# Patient Record
Sex: FEMALE | Race: BLACK OR AFRICAN AMERICAN | Employment: UNEMPLOYED | URBAN - METROPOLITAN AREA
[De-identification: names, ages, dates, MRNs, and addresses within clinical notes are randomized per-mention and may not be internally consistent; named-entity substitution may affect disease eponyms.]

---
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
## 2021-06-03 ENCOUNTER — OFFICE VISIT (OUTPATIENT)
Dept: INTERNAL MEDICINE CLINIC | Age: 21
End: 2021-06-03
Payer: COMMERCIAL

## 2021-06-03 VITALS
HEIGHT: 67 IN | TEMPERATURE: 98.5 F | WEIGHT: 155 LBS | OXYGEN SATURATION: 99 % | HEART RATE: 115 BPM | SYSTOLIC BLOOD PRESSURE: 132 MMHG | RESPIRATION RATE: 17 BRPM | BODY MASS INDEX: 24.33 KG/M2 | DIASTOLIC BLOOD PRESSURE: 80 MMHG

## 2021-06-03 DIAGNOSIS — R73.03 PRE-DIABETES: ICD-10-CM

## 2021-06-03 DIAGNOSIS — Z00.00 ENCOUNTER FOR MEDICAL EXAMINATION TO ESTABLISH CARE: Primary | ICD-10-CM

## 2021-06-03 DIAGNOSIS — E55.9 VITAMIN D DEFICIENCY: ICD-10-CM

## 2021-06-03 DIAGNOSIS — Z11.59 ENCOUNTER FOR HEPATITIS C SCREENING TEST FOR LOW RISK PATIENT: ICD-10-CM

## 2021-06-03 DIAGNOSIS — L30.9 ECZEMA, UNSPECIFIED TYPE: ICD-10-CM

## 2021-06-03 PROCEDURE — 90715 TDAP VACCINE 7 YRS/> IM: CPT | Performed by: INTERNAL MEDICINE

## 2021-06-03 PROCEDURE — 99203 OFFICE O/P NEW LOW 30 MIN: CPT | Performed by: INTERNAL MEDICINE

## 2021-06-03 NOTE — PROGRESS NOTES
Health Maintenance Due   Topic Date Due    Hepatitis C Screening  Never done    HPV Age 9Y-34Y (1 - 2-dose series) Never done    COVID-19 Vaccine (1) Never done    DTaP/Tdap/Td series (1 - Tdap) Never done    PAP AKA CERVICAL CYTOLOGY  Never done       Chief Complaint   Patient presents with   [facility] Patient       1. Have you been to the ER, urgent care clinic since your last visit? Hospitalized since your last visit? No    2. Have you seen or consulted any other health care providers outside of the [address] [place] [address] since your last visit? Include any pap smears or colon screening. No    3) Do you have an Advance Directive on file? no    4) Are you interested in receiving information on Advance Directives? NO      Patient is accompanied by self I have received verbal consent from Belkis Oneal to discuss any/all medical information while they are present in the room.

## 2021-06-03 NOTE — PROGRESS NOTES
HISTORY OF PRESENT ILLNESS  Chantal Martino is a 23 y.o. female. Patient with a history of eczema. Patient was seen to establish care. Patient reports a family history of kidney concerns. Has not had her kidney function checked. Is considering the COVID vaccine. Would like Dtap today. Brought in vaccine history. I need of Dtap. Has not had a PAP. Is not sexually active. Was told she had an elevated a1c a few years back. That eventually came down. Visit Vitals  /80 (BP 1 Location: Right upper arm, BP Patient Position: Sitting, BP Cuff Size: Adult)   Pulse (!) 115   Temp 98.5 °F (36.9 °C) (Oral)   Resp 17   Ht 5' 7\" (1.702 m)   Wt 155 lb (70.3 kg)   LMP 05/12/2021   SpO2 99%   BMI 24.28 kg/m²   History reviewed. No pertinent past medical history. History reviewed. No pertinent surgical history. Family History   Problem Relation Age of Onset    Diabetes Maternal Grandmother     Diabetes Maternal Grandfather     Kidney Disease Maternal Aunt     Thyroid Disease Neg Hx      Outpatient Encounter Medications as of 6/3/2021   Medication Sig Dispense Refill    magic mouthwash solution Take 15 mL by mouth every four (4) hours as needed for Pain (gargle and spit). Magic mouth wash   Maalox  Lidocaine 2% viscous   Diphenhydramine oral solution     Pharmacy to mix equal portions of ingredients to a total volume as indicated in the dispense amount. (Patient not taking: Reported on 6/3/2021) 120 mL 0     No facility-administered encounter medications on file as of 6/3/2021. HPI    Review of Systems   Constitutional: Negative. Respiratory: Negative. Cardiovascular: Negative. Gastrointestinal: Negative. Genitourinary: Negative. Musculoskeletal: Negative. Neurological: Negative. Psychiatric/Behavioral: The patient is nervous/anxious. Physical Exam  Vitals and nursing note reviewed. HENT:      Head: Normocephalic. Eyes:      Pupils: Pupils are equal, round, and reactive to light. Cardiovascular:      Rate and Rhythm: Normal rate and regular rhythm. Pulmonary:      Effort: Pulmonary effort is normal.      Breath sounds: Normal breath sounds. Abdominal:      Palpations: Abdomen is soft. Musculoskeletal:         General: Normal range of motion. Skin:     General: Skin is warm. Comments: exzema to arms    Neurological:      Mental Status: She is alert and oriented to person, place, and time. Psychiatric:         Mood and Affect: Mood is anxious. ASSESSMENT and PLAN  Diagnoses and all orders for this visit:    1. Encounter for medical examination to establish care  -     TETANUS, DIPHTHERIA TOXOIDS AND ACELLULAR PERTUSSIS VACCINE (TDAP), IN INDIVIDS. >=7, IM    2. Eczema, unspecified type  -     METABOLIC PANEL, COMPREHENSIVE; Future  -     CBC WITH AUTOMATED DIFF; Future    3. Pre-diabetes  -     HEMOGLOBIN A1C WITH EAG; Future    4. Encounter for hepatitis C screening test for low risk patient  -     HEPATITIS C AB; Future    5.  Vitamin D deficiency  -     VITAMIN D, 25 HYDROXY; Future          lab results and schedule of future lab studies reviewed with patient  reviewed diet, exercise and weight control  reviewed medications and side effects in detail

## 2021-06-04 LAB
25(OH)D3+25(OH)D2 SERPL-MCNC: 34.9 NG/ML (ref 30–100)
ALBUMIN SERPL-MCNC: 5 G/DL (ref 3.9–5)
ALBUMIN/GLOB SERPL: 1.8 {RATIO} (ref 1.2–2.2)
ALP SERPL-CCNC: 107 IU/L (ref 48–121)
ALT SERPL-CCNC: 19 IU/L (ref 0–32)
AST SERPL-CCNC: 16 IU/L (ref 0–40)
BASOPHILS # BLD AUTO: 0 X10E3/UL (ref 0–0.2)
BASOPHILS NFR BLD AUTO: 1 %
BILIRUB SERPL-MCNC: 0.8 MG/DL (ref 0–1.2)
BUN SERPL-MCNC: 15 MG/DL (ref 6–20)
BUN/CREAT SERPL: 19 (ref 9–23)
CALCIUM SERPL-MCNC: 10.4 MG/DL (ref 8.7–10.2)
CHLORIDE SERPL-SCNC: 104 MMOL/L (ref 96–106)
CO2 SERPL-SCNC: 22 MMOL/L (ref 20–29)
CREAT SERPL-MCNC: 0.79 MG/DL (ref 0.57–1)
EOSINOPHIL # BLD AUTO: 0.1 X10E3/UL (ref 0–0.4)
EOSINOPHIL NFR BLD AUTO: 2 %
ERYTHROCYTE [DISTWIDTH] IN BLOOD BY AUTOMATED COUNT: 12.8 % (ref 11.7–15.4)
EST. AVERAGE GLUCOSE BLD GHB EST-MCNC: 100 MG/DL
GLOBULIN SER CALC-MCNC: 2.8 G/DL (ref 1.5–4.5)
GLUCOSE SERPL-MCNC: 85 MG/DL (ref 65–99)
HBA1C MFR BLD: 5.1 % (ref 4.8–5.6)
HCT VFR BLD AUTO: 45.1 % (ref 34–46.6)
HCV AB S/CO SERPL IA: <0.1 S/CO RATIO (ref 0–0.9)
HGB BLD-MCNC: 14 G/DL (ref 11.1–15.9)
IMM GRANULOCYTES # BLD AUTO: 0 X10E3/UL (ref 0–0.1)
IMM GRANULOCYTES NFR BLD AUTO: 0 %
LYMPHOCYTES # BLD AUTO: 1.1 X10E3/UL (ref 0.7–3.1)
LYMPHOCYTES NFR BLD AUTO: 23 %
MCH RBC QN AUTO: 26.5 PG (ref 26.6–33)
MCHC RBC AUTO-ENTMCNC: 31 G/DL (ref 31.5–35.7)
MCV RBC AUTO: 85 FL (ref 79–97)
MONOCYTES # BLD AUTO: 0.3 X10E3/UL (ref 0.1–0.9)
MONOCYTES NFR BLD AUTO: 5 %
NEUTROPHILS # BLD AUTO: 3.2 X10E3/UL (ref 1.4–7)
NEUTROPHILS NFR BLD AUTO: 69 %
PLATELET # BLD AUTO: 263 X10E3/UL (ref 150–450)
POTASSIUM SERPL-SCNC: 4.5 MMOL/L (ref 3.5–5.2)
PROT SERPL-MCNC: 7.8 G/DL (ref 6–8.5)
RBC # BLD AUTO: 5.29 X10E6/UL (ref 3.77–5.28)
SODIUM SERPL-SCNC: 141 MMOL/L (ref 134–144)
WBC # BLD AUTO: 4.6 X10E3/UL (ref 3.4–10.8)

## 2021-06-10 ENCOUNTER — TELEPHONE (OUTPATIENT)
Dept: INTERNAL MEDICINE CLINIC | Age: 21
End: 2021-06-10

## 2021-10-19 ENCOUNTER — OFFICE VISIT (OUTPATIENT)
Dept: INTERNAL MEDICINE CLINIC | Age: 21
End: 2021-10-19
Payer: COMMERCIAL

## 2021-10-19 VITALS
DIASTOLIC BLOOD PRESSURE: 78 MMHG | WEIGHT: 164 LBS | HEART RATE: 114 BPM | OXYGEN SATURATION: 98 % | RESPIRATION RATE: 16 BRPM | SYSTOLIC BLOOD PRESSURE: 136 MMHG | BODY MASS INDEX: 25.74 KG/M2 | HEIGHT: 67 IN | TEMPERATURE: 98.5 F

## 2021-10-19 DIAGNOSIS — Z23 NEEDS FLU SHOT: ICD-10-CM

## 2021-10-19 DIAGNOSIS — L73.2 HIDRADENITIS SUPPURATIVA OF RIGHT AXILLA: Primary | ICD-10-CM

## 2021-10-19 PROCEDURE — 99213 OFFICE O/P EST LOW 20 MIN: CPT | Performed by: INTERNAL MEDICINE

## 2021-10-19 PROCEDURE — 90686 IIV4 VACC NO PRSV 0.5 ML IM: CPT | Performed by: INTERNAL MEDICINE

## 2021-10-19 NOTE — PROGRESS NOTES
ADVISED PATIENT OF THE FOLLOWING HEALTH MAINTAINCE DUE  Health Maintenance Due   Topic Date Due    HPV Age 9Y-34Y (1 - 2-dose series) Never done    Pap Smear  Never done    Flu Vaccine (1) Never done      Chief Complaint   Patient presents with    Skin Problem    Dry Skin       1. Have you been to the ER, urgent care clinic since your last visit? Hospitalized since your last visit? No    2. Have you seen or consulted any other health care providers outside of the [address] [place] [address] since your last visit? Include any DEXA scan, mammography  or colon screening. No    3. Do you have an Advance Directive on file? no    4. Do you have a DNR on file? NO    Patient is accompanied by self I have received verbal consent from Debbie Gerardo to discuss any/all medical information while they are present in the room. No flowsheet data found. A[facility]a Mcinty[facility] [address]  Phone: [phone] Fax: [phone]      Debbie Gerardo is a 24 y.o. female  who presents for routine immunization(s). Patient denies any symptoms , reactions or allergies that would exclude them from being immunized today. Risks and adverse reactions were discussed. The patient/caregiver was provided the VIS and allotted time to read and ask questions prior to administration of vaccine. Patient voiced full understanding and signed Adult Immunization Consent form. All questions were addressed. Patient was observed for 10 min post injection. There were no reactions observed.

## 2021-10-19 NOTE — PROGRESS NOTES
HISTORY OF PRESENT ILLNESS  Irene Weiss is a 24 y.o. female. Patient was seen after she reports that she has been having reoccurring boils to the right armpit. Reports that it began in July. Will be very painful and drain over time. Had one a few weeks ago, but that has now healed. Only happens to the armpit, no groin. Does sweat a lot to the area and shaves. No fever, does not move to other locations. Visit Vitals  /78 (BP 1 Location: Right arm, BP Patient Position: Sitting, BP Cuff Size: Adult)   Pulse (!) 114   Temp 98.5 °F (36.9 °C) (Oral)   Resp 16   Ht 5' 7\" (1.702 m)   Wt 164 lb (74.4 kg)   LMP 10/05/2021   SpO2 98%   BMI 25.69 kg/m²   No past medical history on file. No past surgical history on file. Family History   Problem Relation Age of Onset    Diabetes Maternal Grandmother     Diabetes Maternal Grandfather     Kidney Disease Maternal Aunt     Thyroid Disease Neg Hx      HPI    Review of Systems   Constitutional: Negative. Respiratory: Negative. Cardiovascular: Negative. Neurological: Negative. Physical Exam  Vitals and nursing note reviewed. Cardiovascular:      Rate and Rhythm: Normal rate and regular rhythm. Pulmonary:      Effort: Pulmonary effort is normal.      Breath sounds: Normal breath sounds. Musculoskeletal:         General: Normal range of motion. Skin:     General: Skin is warm. Comments: Healing area to the right axilla, no drainage, non tender   Neurological:      Mental Status: She is alert and oriented to person, place, and time. ASSESSMENT and PLAN  Diagnoses and all orders for this visit:    1. Hidradenitis suppurativa of right axilla  - consider making a dermatologist appointment   -went over body hygiene to groin and axilla area  - reviewed that pain management and antibiotics use may be need in the future. 2. Needs flu shot      Follow-up and Dispositions    · Return if symptoms worsen or fail to improve.        reviewed diet, exercise and weight control  reviewed medications and side effects in detail

## 2021-10-19 NOTE — PATIENT INSTRUCTIONS
Vaccine Information Statement    Influenza (Flu) Vaccine (Inactivated or Recombinant): What You Need to Know    Many vaccine information statements are available in Chinese and other languages. See www.immunize.org/vis. Hojas de información sobre vacunas están disponibles en español y en muchos otros idiomas. Visite www.immunize.org/vis. 1. Why get vaccinated? Influenza vaccine can prevent influenza (flu). Flu is a contagious disease that spreads around the [facility] every year, usually between October and May. Anyone can get the flu, but it is more dangerous for some people. Infants and young children, people 72 years and older, pregnant people, and people with certain health conditions or a weakened immune system are at greatest risk of flu complications. Pneumonia, bronchitis, sinus infections, and ear infections are examples of flu-related complications. If you have a medical condition, such as heart disease, cancer, or diabetes, flu can make it worse. Flu can cause fever and chills, sore throat, muscle aches, fatigue, cough, headache, and runny or stuffy nose. Some people may have vomiting and diarrhea, though this is more common in children than adults. In an average year, thousands of people in the [facility] die from flu, and many more are hospitalized. Flu vaccine prevents millions of illnesses and flu-related visits to the doctor each year. 2. Influenza vaccines     CDC recommends everyone 6 months and older get vaccinated every flu season. Children 6 months through 6years of age may need 2 doses during a single flu season. Everyone else needs only 1 dose each flu season. It takes about 2 weeks for protection to develop after vaccination. There are many flu viruses, and they are always changing. Each year a new flu vaccine is made to protect against the influenza viruses believed to be likely to cause disease in the upcoming flu season.  Even when the vaccine doesnt exactly match these viruses, it may still provide some protection. Influenza vaccine does not cause flu. Influenza vaccine may be given at the same time as other vaccines. 3. Talk with your health care provider    Tell your vaccination provider if the person getting the vaccine:   Has had an allergic reaction after a previous dose of influenza vaccine, or has any severe, life-threatening allergies    Has ever had Guillain-Barré Syndrome (also called GBS)    In some cases, your health care provider may decide to postpone influenza vaccination until a future visit. Influenza vaccine can be administered at any time during pregnancy. People who are or will be pregnant during influenza season should receive inactivated influenza vaccine. People with minor illnesses, such as a cold, may be vaccinated. People who are moderately or severely ill should usually wait until they recover before getting influenza vaccine. Your health care provider can give you more information. 4. Risks of a vaccine reaction     Soreness, redness, and swelling where the shot is given, fever, muscle aches, and headache can happen after influenza vaccination.  There may be a very small increased risk of Guillain-Barré Syndrome (GBS) after inactivated influenza vaccine (the flu shot). Hildy Paddy children who get the flu shot along with pneumococcal vaccine (PCV13) and/or DTaP vaccine at the same time might be slightly more likely to have a seizure caused by fever. Tell your health care provider if a child who is getting flu vaccine has ever had a seizure. People sometimes faint after medical procedures, including vaccination. Tell your provider if you feel dizzy or have vision changes or ringing in the ears. As with any medicine, there is a very remote chance of a vaccine causing a severe allergic reaction, other serious injury, or death. 5. What if there is a serious problem?     An allergic reaction could occur after the vaccinated person leaves the clinic. If you see signs of a severe allergic reaction (hives, swelling of the face and throat, difficulty breathing, a fast heartbeat, dizziness, or weakness), call 9-1-1 and get the person to the nearest hospital.    For other signs that concern you, call your health care provider. Adverse reactions should be reported to the Vaccine Adverse Event Reporting System (VAERS). Your health care provider will usually file this report, or you can do it yourself. Visit the VAERS website at www.vaers. [facility].gov or call 3-844.524.7283. VAERS is only for reporting reactions, and VAERS staff members do not give medical advice. 6. The National Vaccine Injury Compensation Program    The [facility] Vaccine Injury Compensation Program (VICP) is a federal program that was created to compensate people who may have been injured by certain vaccines. Claims regarding alleged injury or death due to vaccination have a time limit for filing, which may be as short as two years. Visit the VICP website at www.[facility].gov/vaccinecompensation or call 1-356.760.6346 to learn about the program and about filing a claim. 7. How can I learn more?  Ask your health care provider.  Call your local or state health department.  Visit the website of the Food and Drug Administration (FDA) for vaccine package inserts and additional information at www.fda.gov/vaccines-blood-biologics/vaccines.  Contact the Centers for Disease Control and Prevention (CDC):  - Call 3-229.451.2847 (1-800-CDC-INFO) or  - Visit CDCs influenza website at www.cdc.gov/flu. Vaccine Information Statement   Inactivated Influenza Vaccine   8/6/2021  42 DAMION Rivera [id number]   Department of Health and Human Services  Centers for Disease Control and Prevention    Office Use Only

## 2021-10-28 DIAGNOSIS — L73.2 HIDRADENITIS SUPPURATIVA OF RIGHT AXILLA: Primary | ICD-10-CM

## 2021-10-28 RX ORDER — DOXYCYCLINE 100 MG/1
100 TABLET ORAL 2 TIMES DAILY
Qty: 14 TABLET | Refills: 0 | Status: SHIPPED | OUTPATIENT
Start: 2021-10-28 | End: 2022-01-03 | Stop reason: SDUPTHER

## 2022-01-02 ENCOUNTER — PATIENT MESSAGE (OUTPATIENT)
Dept: INTERNAL MEDICINE CLINIC | Age: 22
End: 2022-01-02

## 2022-01-02 DIAGNOSIS — L73.2 HIDRADENITIS SUPPURATIVA OF RIGHT AXILLA: ICD-10-CM

## 2022-01-03 RX ORDER — DOXYCYCLINE 100 MG/1
100 TABLET ORAL 2 TIMES DAILY
Qty: 14 TABLET | Refills: 0 | Status: SHIPPED | OUTPATIENT
Start: 2022-01-03 | End: 2022-05-27 | Stop reason: ALTCHOICE

## 2022-03-18 PROBLEM — L30.9 ECZEMA: Status: ACTIVE | Noted: 2021-06-03

## 2022-05-13 ENCOUNTER — NURSE TRIAGE (OUTPATIENT)
Dept: OTHER | Facility: CLINIC | Age: 22
End: 2022-05-13

## 2022-05-13 NOTE — TELEPHONE ENCOUNTER
Received call from Judson Fernández at [facility] with [employer] Complaint. Subjective: Caller states \"Stye on my eye\"     Current Symptoms: No facial swelling. No redness around her eye. Sty on L eyelid, upper eyelid. Upper eyelid is swollen. No pain    Onset: 1 week ago; worsening    Associated Symptoms: NA    Pain Severity: 0/10; ;     Temperature: denies fever     What has been tried: warm compress    LMP: NA Pregnant: NA    Recommended disposition: See PCP within 3 Days    Care advice provided, patient verbalizes understanding; denies any other questions or concerns; instructed to call back for any new or worsening symptoms. Patient/Caller agrees with recommended disposition; writer provided warm transfer to Alex Osman at [facility] for appointment scheduling    Attention Provider: Thank you for allowing me to participate in the care of your patient. The patient was connected to triage in response to information provided to the [facility]. Please do not respond through this encounter as the response is not directed to a shared pool.       Reason for Disposition   [1] After 5 days of treatment per [facility] Advice AND [2] not better    Protocols used: STY-ADULT-

## 2022-05-27 ENCOUNTER — OFFICE VISIT (OUTPATIENT)
Dept: INTERNAL MEDICINE CLINIC | Age: 22
End: 2022-05-27
Payer: COMMERCIAL

## 2022-05-27 VITALS
OXYGEN SATURATION: 99 % | TEMPERATURE: 98.6 F | WEIGHT: 171 LBS | HEART RATE: 109 BPM | SYSTOLIC BLOOD PRESSURE: 138 MMHG | RESPIRATION RATE: 16 BRPM | HEIGHT: 67 IN | BODY MASS INDEX: 26.84 KG/M2 | DIASTOLIC BLOOD PRESSURE: 80 MMHG

## 2022-05-27 DIAGNOSIS — N63.23 MASS OF LOWER OUTER QUADRANT OF LEFT BREAST: Primary | ICD-10-CM

## 2022-05-27 PROCEDURE — 99213 OFFICE O/P EST LOW 20 MIN: CPT | Performed by: INTERNAL MEDICINE

## 2022-05-27 NOTE — PROGRESS NOTES
Health Maintenance Due   Topic Date Due    HPV Age 9Y-34Y (1 - 2-dose series) Never done    Pap Smear  Never done    COVID-19 Vaccine (3 - Booster for Spence Sanya series) 12/26/2021       Chief Complaint   Patient presents with    Breast Mass       1. Have you been to the ER, urgent care clinic since your last visit? Hospitalized since your last visit? No    2. Have you seen or consulted any other health care providers outside of the [address] [place] [address] since your last visit? Include any pap smears or colon screening. No    3) Do you have an Advance Directive on file? no    4) Are you interested in receiving information on Advance Directives? NO      Patient is accompanied by self I have received verbal consent from Joseph Duggan to discuss any/all medical information while they are present in the room.

## 2022-05-27 NOTE — PROGRESS NOTES
HISTORY OF PRESENT ILLNESS  Jess Cortes is a 24 y.o. female. Patient was seen after she noticed a lump to the left lower breast on 5/11. Reports no pain or changes to breast at all. Had her period that ended on 5/21. Reports that she is not on a BC or has had no PAP. Noticed the area after laying down. No discharge,redness, nipple inversion. Family history of fibrotic breast that at benign . No breast breast Ca in the family. Visit Vitals  /80 (BP 1 Location: Left upper arm, BP Patient Position: Sitting, BP Cuff Size: Adult) Comment: patient very nervous   Pulse (!) 109   Temp 98.6 °F (37 °C) (Oral)   Resp 16   Ht 5' 7\" (1.702 m)   Wt 171 lb (77.6 kg)   SpO2 99%   BMI 26.78 kg/m²     History reviewed. No pertinent past medical history. No past surgical history on file. Family History   Problem Relation Age of Onset    Diabetes Maternal Grandmother     Diabetes Maternal Grandfather     Kidney Disease Maternal Aunt     Thyroid Disease Neg Hx      Outpatient Encounter Medications as of 5/27/2022   Medication Sig Dispense Refill    [DISCONTINUED] doxycycline (ADOXA) 100 mg tablet Take 1 Tablet by mouth two (2) times a day. 14 Tablet 0     No facility-administered encounter medications on file as of 5/27/2022. HPI    Review of Systems   Constitutional: Negative. Respiratory: Negative. Cardiovascular: Negative. Gastrointestinal: Negative. Musculoskeletal: Negative. Skin:        Lump to breast    Neurological: Negative. Physical Exam  Vitals and nursing note reviewed. Exam conducted with a chaperone present. Cardiovascular:      Rate and Rhythm: Normal rate and regular rhythm. Pulmonary:      Effort: Pulmonary effort is normal.      Breath sounds: Normal breath sounds. Chest:      Chest wall: No swelling or tenderness. Breasts: Breasts are symmetrical.      Left: No inverted nipple, skin change or tenderness.             Comments: Tiny area to left breast   Abdominal: Palpations: Abdomen is soft. Skin:     General: Skin is warm. Neurological:      Mental Status: She is alert and oriented to person, place, and time. Psychiatric:         Behavior: Behavior normal.         ASSESSMENT and PLAN  Diagnoses and all orders for this visit:    1.  Mass of lower outer quadrant of left breast  -     US BREAST AXILLA LT; Future      Follow-up and Dispositions    · Return if symptoms worsen or fail to improve.       lab results and schedule of future lab studies reviewed with patient  reviewed medications and side effects in detail

## 2022-07-21 ENCOUNTER — DOCUMENTATION ONLY (OUTPATIENT)
Dept: INTERNAL MEDICINE CLINIC | Age: 22
End: 2022-07-21

## 2022-07-21 ENCOUNTER — NURSE TRIAGE (OUTPATIENT)
Dept: OTHER | Facility: CLINIC | Age: 22
End: 2022-07-21

## 2022-07-21 NOTE — TELEPHONE ENCOUNTER
Received call from Malika Rodas at [facility] with [employer] Complaint. Subjective: Caller states \"high blood pressure\" 145/98    Current Symptoms: nose bleed on July 1st, none since then    Onset: 3 weeks ago; sudden    Associated Symptoms: NA    Pain Severity: 0/10; Temperature: denies fever     What has been tried: nothing    LMP:  July 4th  Pregnant: No    Recommended disposition: See in Office Within 2 Weeks    Care advice provided, patient verbalizes understanding; denies any other questions or concerns; instructed to call back for any new or worsening symptoms. Patient/Caller agrees with recommended disposition; writer provided warm transfer to [employer] at [facility] for appointment scheduling    Attention Provider: Thank you for allowing me to participate in the care of your patient. The patient was connected to triage in response to information provided to the [facility]. Please do not respond through this encounter as the response is not directed to a shared pool.     Reason for Disposition   Systolic BP >= 910 OR Diastolic >= 80, and is not taking BP medications    Protocols used: Blood Pressure - High-ADULT-[place]

## 2022-07-21 NOTE — PROGRESS NOTES
Pt sent over from nurse triage for an appointment. Pt states her bp is high, no symptoms. Pt requesting an appointment for next week. Pt not able to come in for appointment on 7/25. Pt scheduled for vv on 7/26/22 with Glenn aSnders. Pt advised to go to urgent care if symptoms develop or if she has concerns.  Pt voiced understanding

## 2022-07-26 ENCOUNTER — PATIENT MESSAGE (OUTPATIENT)
Dept: INTERNAL MEDICINE CLINIC | Age: 22
End: 2022-07-26

## 2022-07-26 ENCOUNTER — VIRTUAL VISIT (OUTPATIENT)
Dept: INTERNAL MEDICINE CLINIC | Age: 22
End: 2022-07-26

## 2022-07-26 DIAGNOSIS — I10 HYPERTENSION, UNSPECIFIED TYPE: Primary | ICD-10-CM

## 2022-07-26 PROCEDURE — 99213 OFFICE O/P EST LOW 20 MIN: CPT | Performed by: NURSE PRACTITIONER

## 2022-07-26 RX ORDER — CHOLECALCIFEROL (VITAMIN D3) 50 MCG
CAPSULE ORAL
COMMUNITY

## 2022-07-26 RX ORDER — VITAMIN E CAP 100 UNIT 100 UNIT
CAP ORAL DAILY
COMMUNITY

## 2022-07-26 RX ORDER — UREA 10 %
100 LOTION (ML) TOPICAL DAILY
COMMUNITY

## 2022-07-26 RX ORDER — LOSARTAN POTASSIUM 25 MG/1
25 TABLET ORAL DAILY
Qty: 30 TABLET | Refills: 1 | Status: SHIPPED | OUTPATIENT
Start: 2022-07-26 | End: 2022-08-19 | Stop reason: DRUGHIGH

## 2022-07-26 RX ORDER — ASCORBIC ACID 500 MG
TABLET ORAL
COMMUNITY

## 2022-07-26 NOTE — PROGRESS NOTES
Marivel Michelle is a 24 y.o. female who was seen by synchronous (real-time) audio-video technology on 7/26/2022 for Hypertension      Assessment & Plan:   Diagnoses and all orders for this visit:    1. Hypertension, unspecified type  Will order  -     losartan (COZAAR) 25 mg tablet; Take 1 Tablet by mouth in the morning. Advised to monitor BP daily and follow-up in office in 2 weeks, or sooner as needed. Patient encouraged to call or return to office if symptoms do not improve or worsen. If experiencing severe shortness of breath or chest pain, present to the ER. If experiencing severe head pain and/or neurological changes such as slurred speech, vision change, or extremity weakness, present to ER. Reviewed medications and side effects in detail. Reviewed plan of care with patient who acknowledges understanding and agrees. Subjective: This patient of Chava Varghese NP presents today with concerns about her blood pressure. The patient states she experienced a nosebleed on 07/01/22. Nosebleed quickly resolved with pressure to nose. She did have small amount of blood in nostrils for next two days, as well. No other s/sx of bleeding/ bruising. No further nosebleeds. The patient has monitored her BP since time of incident and found it to be consistently elevated, 140s-160s/80s-90s on home BP monitoring. She denies chest pain, shortness of breath, and dizziness. She has had intermittent mild frontal headaches. Patient mentions a strong family history of kidney disease. Patient states she typically follows a healthy diet and exercises regularly.   Lab Results   Component Value Date/Time    Sodium 141 06/03/2021 11:17 AM    Potassium 4.5 06/03/2021 11:17 AM    Chloride 104 06/03/2021 11:17 AM    CO2 22 06/03/2021 11:17 AM    Glucose 85 06/03/2021 11:17 AM    BUN 15 06/03/2021 11:17 AM    Creatinine 0.79 06/03/2021 11:17 AM    BUN/Creatinine ratio 19 06/03/2021 11:17 AM    GFR est  06/03/2021 11:17 AM    GFR est non- 06/03/2021 11:17 AM    Calcium 10.4 (H) 06/03/2021 11:17 AM       Prior to Admission medications    Medication Sig Start Date End Date Taking? Authorizing Provider   vitamin e (E GEMS) 100 unit capsule Take  by mouth daily. Yes Provider, Historical   zinc sulfate (ZINC-220 PO) Take  by mouth. Yes Provider, Historical   omega 3-dha-epa-fish oil (Fish OiL) 100-160-1,000 mg cap Take  by mouth. Yes Provider, Historical   prenatal multivit-ca-min-fe-fa tab Take  by mouth. Yes Provider, Historical   ascorbic acid, vitamin C, (Vitamin C) 500 mg tablet Take  by mouth. Yes Provider, Historical   cholecalciferol, vitamin D3, (D3-2000 PO) Take  by mouth. Yes Provider, Historical   cyanocobalamin (Vitamin B-12) 100 mcg tablet Take 100 mcg by mouth in the morning. Yes Provider, Historical   losartan (COZAAR) 25 mg tablet Take 1 Tablet by mouth in the morning. 7/26/22  Yes Noel Boo NP     No Known Allergies  No past medical history on file. No past surgical history on file. Review of Systems   Constitutional:  Negative for chills, fever and malaise/fatigue. HENT: Negative. Respiratory: Negative. Cardiovascular: Negative. Gastrointestinal: Negative. Genitourinary: Negative. Musculoskeletal: Negative. Skin: Negative. Neurological:  Positive for headaches. Negative for dizziness, sensory change, speech change and focal weakness. Psychiatric/Behavioral:  The patient is nervous/anxious.       Objective:     Patient-Reported Vitals 7/26/2022   Patient-Reported Weight 164.6   Patient-Reported Pulse 107   Patient-Reported Temperature 97.1   Patient-Reported Systolic  290   Patient-Reported Diastolic 87        [INSTRUCTIONS:  \"[x]\" Indicates a positive item  \"[]\" Indicates a negative item  -- DELETE ALL ITEMS NOT EXAMINED]    Constitutional: [x] Appears well-developed and well-nourished [x] No apparent distress      [] Abnormal -     Mental status: [x] Alert and awake  [x] Oriented to person/place/time [x] Able to follow commands    [] Abnormal -     Eyes:   EOM    [x]  Normal    [] Abnormal -   Sclera  [x]  Normal    [] Abnormal -          Discharge [x]  None visible   [] Abnormal -     HENT: [x] Normocephalic, atraumatic  [] Abnormal -       Neck: [x] No visualized mass [] Abnormal -     Pulmonary/Chest: [x] Respiratory effort normal   [x] No visualized signs of difficulty breathing or respiratory distress        [] Abnormal -      Musculoskeletal:           [x] Normal range of motion of neck        [] Abnormal -     Neurological:        [x] No Facial Asymmetry (Cranial nerve 7 motor function) (limited exam due to video visit)          [x] No gaze palsy        [] Abnormal -          Skin:        [x] No significant exanthematous lesions or discoloration noted on facial skin         [] Abnormal -            Psychiatric:       [x] Normal Affect [] Abnormal -        [x] No Hallucinations    Other pertinent observable physical exam findings:-        We discussed the expected course, resolution and complications of the diagnosis(es) in detail. Medication risks, benefits, costs, interactions, and alternatives were discussed as indicated. I advised her to contact the office if her condition worsens, changes or fails to improve as anticipated. She expressed understanding with the diagnosis(es) and plan. Gloria Peter, was evaluated through a synchronous (real-time) audio-video encounter. The patient (or guardian if applicable) is aware that this is a billable service, which includes applicable co-pays. This Virtual Visit was conducted with patient's (and/or legal guardian's) consent. The visit was conducted pursuant to the emergency declaration under the [address] [place] [address] authority and the [employer] and [employer] General Act.   Patient identification was verified, and a caregiver was present when appropriate.   The patient was located at: Home: [address] [facility] [address]  The provider was located at: Home: [unfilled]        Debbie Wang NP

## 2022-07-27 DIAGNOSIS — I10 HYPERTENSION, UNSPECIFIED TYPE: Primary | ICD-10-CM

## 2022-07-30 LAB
ALBUMIN SERPL-MCNC: 4.9 G/DL (ref 3.9–5)
ALBUMIN/GLOB SERPL: 1.8 {RATIO} (ref 1.2–2.2)
ALP SERPL-CCNC: 91 IU/L (ref 44–121)
ALT SERPL-CCNC: 28 IU/L (ref 0–32)
AST SERPL-CCNC: 17 IU/L (ref 0–40)
BILIRUB SERPL-MCNC: 0.8 MG/DL (ref 0–1.2)
BUN SERPL-MCNC: 10 MG/DL (ref 6–20)
BUN/CREAT SERPL: 13 (ref 9–23)
CALCIUM SERPL-MCNC: 10.2 MG/DL (ref 8.7–10.2)
CHLORIDE SERPL-SCNC: 106 MMOL/L (ref 96–106)
CO2 SERPL-SCNC: 22 MMOL/L (ref 20–29)
CREAT SERPL-MCNC: 0.78 MG/DL (ref 0.57–1)
EGFR: 110 ML/MIN/1.73
GLOBULIN SER CALC-MCNC: 2.7 G/DL (ref 1.5–4.5)
GLUCOSE SERPL-MCNC: 85 MG/DL (ref 65–99)
POTASSIUM SERPL-SCNC: 5.2 MMOL/L (ref 3.5–5.2)
PROT SERPL-MCNC: 7.6 G/DL (ref 6–8.5)
SODIUM SERPL-SCNC: 142 MMOL/L (ref 134–144)

## 2022-08-02 ENCOUNTER — PATIENT MESSAGE (OUTPATIENT)
Dept: INTERNAL MEDICINE CLINIC | Age: 22
End: 2022-08-02

## 2022-08-11 ENCOUNTER — VIRTUAL VISIT (OUTPATIENT)
Dept: INTERNAL MEDICINE CLINIC | Age: 22
End: 2022-08-11
Payer: COMMERCIAL

## 2022-08-11 DIAGNOSIS — I10 HYPERTENSION, UNSPECIFIED TYPE: Primary | ICD-10-CM

## 2022-08-11 PROCEDURE — 99213 OFFICE O/P EST LOW 20 MIN: CPT | Performed by: INTERNAL MEDICINE

## 2022-08-11 NOTE — PROGRESS NOTES
Leo Juan is a 24 y.o. female who was seen by synchronous (real-time) audio-video technology on 8/11/2022 for Hypertension and Dry Skin        Assessment & Plan:   Diagnoses and all orders for this visit:    1. Hypertension, unspecified type  - losartan 25 mg daily  -DASH diet  -BP goal below 140  - labs stable     Follow-up and Dispositions    Return in about 6 months (around 2/11/2023), or if symptoms worsen or fail to improve. Subjective:     Patient was seen via video. Was started on BP medication in the last few weeks. Reports that she had a nosebleed and after taking her BP it was elevated for several days. She is now getting BP in the 120's with an occasional 130-140. Reports that she is trying to at lean and workout. Family history of CKD and HTN    Prior to Admission medications    Medication Sig Start Date End Date Taking? Authorizing Provider   vitamin e (E GEMS) 100 unit capsule Take  by mouth daily. Yes Provider, Historical   zinc sulfate (ZINC-220 PO) Take  by mouth. Yes Provider, Historical   omega 3-dha-epa-fish oil (Fish OiL) 100-160-1,000 mg cap Take  by mouth. Yes Provider, Historical   prenatal multivit-ca-min-fe-fa tab Take  by mouth. Yes Provider, Historical   ascorbic acid, vitamin C, (VITAMIN C) 500 mg tablet Take  by mouth. Yes Provider, Historical   cholecalciferol, vitamin D3, (D3-2000 PO) Take  by mouth. Yes Provider, Historical   cyanocobalamin (VITAMIN B12) 100 mcg tablet Take 100 mcg by mouth in the morning. Yes Provider, Historical   losartan (COZAAR) 25 mg tablet Take 1 Tablet by mouth in the morning.  7/26/22  Yes Ryan Ann, NP     Patient Active Problem List   Diagnosis Code    Excessive weight gain R63.5    Acanthosis nigricans L83    Abnormal laboratory test R89.9    Eczema L30.9     Patient Active Problem List    Diagnosis Date Noted    Eczema 06/03/2021    Excessive weight gain 03/11/2014    Acanthosis nigricans 03/11/2014    Abnormal laboratory test 03/11/2014     Current Outpatient Medications   Medication Sig Dispense Refill    vitamin e (E GEMS) 100 unit capsule Take  by mouth daily. zinc sulfate (ZINC-220 PO) Take  by mouth. omega 3-dha-epa-fish oil (Fish OiL) 100-160-1,000 mg cap Take  by mouth.      prenatal multivit-ca-min-fe-fa tab Take  by mouth. ascorbic acid, vitamin C, (VITAMIN C) 500 mg tablet Take  by mouth. cholecalciferol, vitamin D3, (D3-2000 PO) Take  by mouth.      cyanocobalamin (VITAMIN B12) 100 mcg tablet Take 100 mcg by mouth in the morning. losartan (COZAAR) 25 mg tablet Take 1 Tablet by mouth in the morning. 30 Tablet 1     No Known Allergies  History reviewed. No pertinent past medical history. History reviewed. No pertinent surgical history. Family History   Problem Relation Age of Onset    Hypertension Mother     Hypertension Father     Kidney Disease Maternal Aunt     Diabetes Maternal Grandmother     Diabetes Maternal Grandfather     Thyroid Disease Neg Hx      Social History     Tobacco Use    Smoking status: Never    Smokeless tobacco: Never   Substance Use Topics    Alcohol use: No       Review of Systems   All other systems reviewed and are negative.     Objective:     Patient-Reported Vitals 8/11/2022   Patient-Reported Weight -   Patient-Reported Pulse 102   Patient-Reported Temperature -   Patient-Reported Systolic  295   Patient-Reported Diastolic 86        [INSTRUCTIONS:  \"[x]\" Indicates a positive item  \"[]\" Indicates a negative item  -- DELETE ALL ITEMS NOT EXAMINED]    Constitutional: [x] Appears well-developed and well-nourished [x] No apparent distress      [] Abnormal -     Mental status: [x] Alert and awake  [x] Oriented to person/place/time [x] Able to follow commands    [] Abnormal -     Eyes:   EOM    [x]  Normal    [] Abnormal -   Sclera  [x]  Normal    [] Abnormal -          Discharge [x]  None visible   [] Abnormal -     HENT: [x] Normocephalic, atraumatic  [] Abnormal -   [x] Mouth/Throat: Mucous membranes are moist    External Ears [x] Normal  [] Abnormal -    Neck: [x] No visualized mass [] Abnormal -     Pulmonary/Chest: [x] Respiratory effort normal   [x] No visualized signs of difficulty breathing or respiratory distress        [] Abnormal -      Musculoskeletal:   [x] Normal gait with no signs of ataxia         [x] Normal range of motion of neck        [] Abnormal -     Neurological:        [x] No Facial Asymmetry (Cranial nerve 7 motor function) (limited exam due to video visit)          [x] No gaze palsy        [] Abnormal -          Skin:        [x] No significant exanthematous lesions or discoloration noted on facial skin         [] Abnormal -            Psychiatric:       [x] Normal Affect [] Abnormal -        [x] No Hallucinations    Other pertinent observable physical exam findings:-        We discussed the expected course, resolution and complications of the diagnosis(es) in detail. Medication risks, benefits, costs, interactions, and alternatives were discussed as indicated. I advised her to contact the office if her condition worsens, changes or fails to improve as anticipated. She expressed understanding with the diagnosis(es) and plan. Aleida Garcia, was evaluated through a synchronous (real-time) audio-video encounter. The patient (or guardian if applicable) is aware that this is a billable service, which includes applicable co-pays. This Virtual Visit was conducted with patient's (and/or legal guardian's) consent. The visit was conducted pursuant to the emergency declaration under the [address] [place] [address], [address] [place] [address] authority and the [employer] and [employer] General Act. Patient identification was verified, and a caregiver was present when appropriate. The patient was located at: Home: [address] [place] [id number]  The provider was located at:  Other: ana m Rodriguez NP

## 2022-08-11 NOTE — PROGRESS NOTES
Health Maintenance Due   Topic Date Due    HPV Age 9Y-34Y (1 - 2-dose series) Never done    Pap Smear  Never done    COVID-19 Vaccine (3 - Booster for Spence Sanya series) 12/26/2021       Chief Complaint   Patient presents with    Hypertension    Dry Skin       1. Have you been to the ER, urgent care clinic since your last visit? Hospitalized since your last visit? No    2. Have you seen or consulted any other health care providers outside of the [address] [place] [address] since your last visit? Include any pap smears or colon screening. No    3) Do you have an Advance Directive on file? no    4) Are you interested in receiving information on Advance Directives? NO      Patient is accompanied by self I have received verbal consent from Julio Mccurdy to discuss any/all medical information while they are present in the room.

## 2022-08-19 DIAGNOSIS — I10 HYPERTENSION, UNSPECIFIED TYPE: Primary | ICD-10-CM

## 2022-08-19 RX ORDER — LOSARTAN POTASSIUM 50 MG/1
50 TABLET ORAL DAILY
Qty: 60 TABLET | Refills: 1 | Status: SHIPPED | OUTPATIENT
Start: 2022-08-19 | End: 2022-09-12

## 2022-08-26 RX ORDER — DOXYCYCLINE 100 MG/1
100 TABLET ORAL 2 TIMES DAILY
Qty: 14 TABLET | Refills: 0 | Status: SHIPPED | OUTPATIENT
Start: 2022-08-26 | End: 2022-09-02

## 2022-09-12 DIAGNOSIS — I10 HYPERTENSION, UNSPECIFIED TYPE: Primary | ICD-10-CM

## 2022-09-12 RX ORDER — BENAZEPRIL/HYDROCHLOROTHIAZIDE 20 MG-25MG
1 TABLET ORAL DAILY
Qty: 30 TABLET | Refills: 1 | Status: SHIPPED | OUTPATIENT
Start: 2022-09-12 | End: 2022-10-10 | Stop reason: ALTCHOICE

## 2022-10-10 DIAGNOSIS — I10 HYPERTENSION, UNSPECIFIED TYPE: Primary | ICD-10-CM

## 2022-10-10 RX ORDER — CANDESARTAN CILEXETIL AND HYDROCHLOROTHIAZIDE 32; 25 MG/1; MG/1
1 TABLET ORAL DAILY
Qty: 60 TABLET | Refills: 1 | Status: SHIPPED | OUTPATIENT
Start: 2022-10-10

## 2022-11-16 ENCOUNTER — HOSPITAL ENCOUNTER (OUTPATIENT)
Dept: MAMMOGRAPHY | Age: 22
Discharge: HOME | End: 2022-11-16
Payer: COMMERCIAL

## 2022-11-16 DIAGNOSIS — N63.23 MASS OF LOWER OUTER QUADRANT OF LEFT BREAST: ICD-10-CM

## 2022-11-16 PROCEDURE — 76642 ULTRASOUND BREAST LIMITED: CPT

## 2022-11-16 PROCEDURE — 76882 US LMTD JT/FCL EVL NVASC XTR: CPT

## 2023-01-06 DIAGNOSIS — I10 HYPERTENSION, UNSPECIFIED TYPE: ICD-10-CM

## 2023-01-06 RX ORDER — CANDESARTAN CILEXETIL AND HYDROCHLOROTHIAZIDE 32; 25 MG/1; MG/1
1 TABLET ORAL DAILY
Qty: 60 TABLET | Refills: 1 | Status: SHIPPED | OUTPATIENT
Start: 2023-01-06

## 2023-01-24 DIAGNOSIS — L02.92 BOIL: Primary | ICD-10-CM

## 2023-01-24 RX ORDER — DOXYCYCLINE 100 MG/1
100 TABLET ORAL 2 TIMES DAILY
Qty: 14 TABLET | Refills: 0 | Status: SHIPPED | OUTPATIENT
Start: 2023-01-24

## 2023-03-15 DIAGNOSIS — L02.92 BOIL: ICD-10-CM

## 2023-03-15 RX ORDER — DOXYCYCLINE 100 MG/1
100 TABLET ORAL 2 TIMES DAILY
Qty: 14 TABLET | Refills: 0 | Status: SHIPPED | OUTPATIENT
Start: 2023-03-15

## 2024-11-13 ENCOUNTER — TELEPHONE (OUTPATIENT)
Age: 24
End: 2024-11-13

## 2024-11-13 NOTE — TELEPHONE ENCOUNTER
----- Message from Princess SANTOS sent at 11/13/2024  2:26 PM EST -----  Regarding: [facility] Appointment Request  [facility] Appointment Request    Patient needs appointment for [facility] Appointment Type: Annual Visit & Pap Smear    Patient Requested Dates(s): May 14 ,2025  Patient Requested Time: Around 8 AM   Provider Name: Martha Dugan, APRN - NP    Reason for Appointment Request: Established Patient - Available appointments did not meet patient need.  Patient would like to schedule her Annual Physical and Pap Smear  --------------------------------------------------------------------------------------------------------------------------    Relationship to Patient: Self     Call Back Information: OK to leave message on voicemail  Preferred Call Back Number: Phone [phone]

## 2025-05-05 ENCOUNTER — OFFICE VISIT (OUTPATIENT)
Age: 25
End: 2025-05-05
Payer: COMMERCIAL

## 2025-05-05 VITALS
HEART RATE: 98 BPM | RESPIRATION RATE: 22 BRPM | DIASTOLIC BLOOD PRESSURE: 78 MMHG | WEIGHT: 167.2 LBS | BODY MASS INDEX: 26.24 KG/M2 | SYSTOLIC BLOOD PRESSURE: 120 MMHG | HEIGHT: 67 IN | OXYGEN SATURATION: 98 % | TEMPERATURE: 98.9 F

## 2025-05-05 DIAGNOSIS — E55.9 VITAMIN D DEFICIENCY: ICD-10-CM

## 2025-05-05 DIAGNOSIS — Z00.00 WELL ADULT EXAM: Primary | ICD-10-CM

## 2025-05-05 DIAGNOSIS — Z13.220 LIPID SCREENING: ICD-10-CM

## 2025-05-05 DIAGNOSIS — Z00.00 WELL ADULT EXAM: ICD-10-CM

## 2025-05-05 PROCEDURE — 99395 PREV VISIT EST AGE 18-39: CPT | Performed by: INTERNAL MEDICINE

## 2025-05-05 SDOH — ECONOMIC STABILITY: FOOD INSECURITY: WITHIN THE PAST 12 MONTHS, YOU WORRIED THAT YOUR FOOD WOULD RUN OUT BEFORE YOU GOT MONEY TO BUY MORE.: NEVER TRUE

## 2025-05-05 SDOH — ECONOMIC STABILITY: FOOD INSECURITY: WITHIN THE PAST 12 MONTHS, THE FOOD YOU BOUGHT JUST DIDN'T LAST AND YOU DIDN'T HAVE MONEY TO GET MORE.: NEVER TRUE

## 2025-05-05 ASSESSMENT — PATIENT HEALTH QUESTIONNAIRE - PHQ9
2. FEELING DOWN, DEPRESSED OR HOPELESS: NOT AT ALL
1. LITTLE INTEREST OR PLEASURE IN DOING THINGS: NOT AT ALL
SUM OF ALL RESPONSES TO PHQ QUESTIONS 1-9: 0

## 2025-05-05 NOTE — PROGRESS NOTES
Chief Complaint   Patient presents with    Annual Exam     Have you been to the ER, urgent care clinic since your last visit?  Hospitalized since your last visit?   NO    Have you seen or consulted any other health care providers outside our system since your last visit?   NO     “Have you had a pap smear?”    NO    No cervical cancer screening on file

## 2025-05-05 NOTE — PROGRESS NOTES
Subjective    Deborah Juares is a 24 y.o. female who presents today for the following:  Chief Complaint   Patient presents with    Annual Exam       History of Present Illness  The patient presents for a physical exam.    She reports no new medical conditions since her last visit. Her most recent medical encounter was an ultrasound of her breast, which yielded benign results. She has not undergone any recent blood work. She had scheduled her first Pap smear but had to cancel due to menstruation. She is sexually active and will reschedule the Pap smear. She is currently not on any medications. She has expressed interest in receiving vaccinations.    She has been off antihypertensive medication since 2023 and monitors her blood pressure intermittently, which remains well-controlled. She experienced a headache yesterday, during which her blood pressure was normal. However, she noted an increase in blood pressure after taking Excedrin. She is considering switching to aspirin from Excedrin. She also inquired about the use of Advil liquid gel, given her family history of kidney disease.    FAMILY HISTORY  She has a family history of kidney issues.        PMH/PSH/Allergies/Social History/medication list and most recent studies reviewed with patient.     reports that she has never smoked. She has never used smokeless tobacco.    reports no history of alcohol use.   Results  Imaging   - Ultrasound of breast: Benign     Vitals:    05/05/25 1257   BP: 120/78   Pulse: 98   Resp: 22   Temp: 98.9 °F (37.2 °C)   SpO2: 98%     Body mass index is 26.19 kg/m².      5/5/2025    12:57 PM 4/14/2023     8:46 AM 5/27/2022     8:31 AM 10/19/2021     2:00 PM 6/3/2021     7:00 AM 7/25/2014     9:24 AM 3/6/2014    11:18 AM   Weight Metrics   Weight 167 lb 3.2 oz 160 lb 171 lb 164 lb 155 lb 205 lb 1.6 oz 206 lb 3.2 oz   BMI (Calculated) 26.2 kg/m2 25.1 kg/m2 26.8 kg/m2 25.7 kg/m2 24.3 kg/m2 31.8 kg/m2 32.6 kg/m2       History reviewed. No pertinent

## 2025-05-06 LAB
25(OH)D3+25(OH)D2 SERPL-MCNC: 36.2 NG/ML (ref 30–100)
BASOPHILS # BLD AUTO: 0 X10E3/UL (ref 0–0.2)
BASOPHILS NFR BLD AUTO: 0 %
EOSINOPHIL # BLD AUTO: 0.1 X10E3/UL (ref 0–0.4)
EOSINOPHIL NFR BLD AUTO: 1 %
ERYTHROCYTE [DISTWIDTH] IN BLOOD BY AUTOMATED COUNT: 11.9 % (ref 11.7–15.4)
HBA1C MFR BLD: 5.3 % (ref 4.8–5.6)
HCT VFR BLD AUTO: 44.4 % (ref 34–46.6)
HGB BLD-MCNC: 14.5 G/DL (ref 11.1–15.9)
IMM GRANULOCYTES # BLD AUTO: 0 X10E3/UL (ref 0–0.1)
IMM GRANULOCYTES NFR BLD AUTO: 0 %
LYMPHOCYTES # BLD AUTO: 1.1 X10E3/UL (ref 0.7–3.1)
LYMPHOCYTES NFR BLD AUTO: 20 %
MCH RBC QN AUTO: 28.5 PG (ref 26.6–33)
MCHC RBC AUTO-ENTMCNC: 32.7 G/DL (ref 31.5–35.7)
MCV RBC AUTO: 87 FL (ref 79–97)
MONOCYTES # BLD AUTO: 0.3 X10E3/UL (ref 0.1–0.9)
MONOCYTES NFR BLD AUTO: 5 %
NEUTROPHILS # BLD AUTO: 4.1 X10E3/UL (ref 1.4–7)
NEUTROPHILS NFR BLD AUTO: 74 %
PLATELET # BLD AUTO: 265 X10E3/UL (ref 150–450)
RBC # BLD AUTO: 5.09 X10E6/UL (ref 3.77–5.28)
WBC # BLD AUTO: 5.6 X10E3/UL (ref 3.4–10.8)

## 2025-05-07 ENCOUNTER — RESULTS FOLLOW-UP (OUTPATIENT)
Age: 25
End: 2025-05-07

## 2025-05-07 LAB
ALBUMIN SERPL-MCNC: 4.7 G/DL (ref 4–5)
ALP SERPL-CCNC: 90 IU/L (ref 44–121)
ALT SERPL-CCNC: 16 IU/L (ref 0–32)
AST SERPL-CCNC: 14 IU/L (ref 0–40)
BILIRUB SERPL-MCNC: 0.9 MG/DL (ref 0–1.2)
BUN SERPL-MCNC: 10 MG/DL (ref 6–20)
BUN/CREAT SERPL: 12 (ref 9–23)
CALCIUM SERPL-MCNC: 10.3 MG/DL (ref 8.7–10.2)
CHLORIDE SERPL-SCNC: 105 MMOL/L (ref 96–106)
CHOLEST SERPL-MCNC: 171 MG/DL (ref 100–199)
CO2 SERPL-SCNC: 19 MMOL/L (ref 20–29)
CREAT SERPL-MCNC: 0.84 MG/DL (ref 0.57–1)
EGFRCR SERPLBLD CKD-EPI 2021: 99 ML/MIN/1.73
GLOBULIN SER CALC-MCNC: 2.5 G/DL (ref 1.5–4.5)
GLUCOSE SERPL-MCNC: 82 MG/DL (ref 70–99)
HDLC SERPL-MCNC: 84 MG/DL
IMP & REVIEW OF LAB RESULTS: NORMAL
LDLC SERPL CALC-MCNC: 75 MG/DL (ref 0–99)
POTASSIUM SERPL-SCNC: 4.4 MMOL/L (ref 3.5–5.2)
PROT SERPL-MCNC: 7.2 G/DL (ref 6–8.5)
SODIUM SERPL-SCNC: 143 MMOL/L (ref 134–144)
TRIGL SERPL-MCNC: 61 MG/DL (ref 0–149)
TSH SERPL DL<=0.005 MIU/L-ACNC: 1.31 UIU/ML (ref 0.45–4.5)
VLDLC SERPL CALC-MCNC: 12 MG/DL (ref 5–40)

## 2025-08-12 ENCOUNTER — OFFICE VISIT (OUTPATIENT)
Age: 25
End: 2025-08-12
Payer: COMMERCIAL

## 2025-08-12 VITALS
RESPIRATION RATE: 16 BRPM | DIASTOLIC BLOOD PRESSURE: 74 MMHG | HEART RATE: 107 BPM | OXYGEN SATURATION: 99 % | SYSTOLIC BLOOD PRESSURE: 104 MMHG | WEIGHT: 174.6 LBS | BODY MASS INDEX: 27.4 KG/M2 | HEIGHT: 67 IN

## 2025-08-12 DIAGNOSIS — Z12.4 PAP SMEAR FOR CERVICAL CANCER SCREENING: Primary | ICD-10-CM

## 2025-08-12 PROCEDURE — 99213 OFFICE O/P EST LOW 20 MIN: CPT | Performed by: INTERNAL MEDICINE

## 2025-08-15 ENCOUNTER — RESULTS FOLLOW-UP (OUTPATIENT)
Age: 25
End: 2025-08-15

## 2025-08-15 LAB
CYTOLOGIST CVX/VAG CYTO: NORMAL
CYTOLOGY CVX/VAG DOC CYTO: NORMAL
CYTOLOGY CVX/VAG DOC THIN PREP: NORMAL
DX ICD CODE: NORMAL
HPV GENOTYPE REFLEX: NORMAL
HPV I/H RISK 4 DNA CVX QL PROBE+SIG AMP: NEGATIVE
Lab: NORMAL
OTHER STN SPEC: NORMAL
SERVICE CMNT-IMP: NORMAL
STAT OF ADQ CVX/VAG CYTO-IMP: NORMAL